# Patient Record
Sex: MALE | URBAN - METROPOLITAN AREA
[De-identification: names, ages, dates, MRNs, and addresses within clinical notes are randomized per-mention and may not be internally consistent; named-entity substitution may affect disease eponyms.]

---

## 2022-12-15 ENCOUNTER — OFFICE VISIT (OUTPATIENT)
Dept: ONCOLOGY | Age: 37
End: 2022-12-15
Payer: OTHER GOVERNMENT

## 2022-12-15 ENCOUNTER — HOSPITAL ENCOUNTER (OUTPATIENT)
Dept: LAB | Age: 37
Discharge: HOME | End: 2022-12-15
Payer: OTHER GOVERNMENT

## 2022-12-15 VITALS
WEIGHT: 201.6 LBS | BODY MASS INDEX: 31.64 KG/M2 | DIASTOLIC BLOOD PRESSURE: 80 MMHG | OXYGEN SATURATION: 95 % | HEIGHT: 67 IN | SYSTOLIC BLOOD PRESSURE: 122 MMHG | HEART RATE: 91 BPM | RESPIRATION RATE: 16 BRPM

## 2022-12-15 DIAGNOSIS — D75.839 THROMBOCYTOSIS: ICD-10-CM

## 2022-12-15 DIAGNOSIS — D75.1 POLYCYTHEMIA: ICD-10-CM

## 2022-12-15 DIAGNOSIS — D75.839 THROMBOCYTOSIS: Primary | ICD-10-CM

## 2022-12-15 LAB
ALBUMIN SERPL-MCNC: 4.6 G/DL (ref 3.4–5)
ALBUMIN/GLOB SERPL: 1.5 {RATIO} (ref 0.8–1.7)
ALP SERPL-CCNC: 84 U/L (ref 45–117)
ALT SERPL-CCNC: 53 U/L (ref 16–61)
ANION GAP SERPL CALC-SCNC: 6 MMOL/L (ref 3–18)
AST SERPL-CCNC: 21 U/L (ref 10–38)
BASOPHILS # BLD: 0.2 K/UL (ref 0–0.1)
BASOPHILS NFR BLD: 2 % (ref 0–2)
BILIRUB SERPL-MCNC: 0.5 MG/DL (ref 0.2–1)
BUN SERPL-MCNC: 22 MG/DL (ref 7–18)
BUN/CREAT SERPL: 17 (ref 12–20)
CALCIUM SERPL-MCNC: 10.2 MG/DL (ref 8.5–10.1)
CHLORIDE SERPL-SCNC: 104 MMOL/L (ref 100–111)
CO2 SERPL-SCNC: 30 MMOL/L (ref 21–32)
CREAT SERPL-MCNC: 1.27 MG/DL (ref 0.6–1.3)
DIFFERENTIAL METHOD BLD: ABNORMAL
EOSINOPHIL # BLD: 0.8 K/UL (ref 0–0.4)
EOSINOPHIL NFR BLD: 8 % (ref 0–5)
ERYTHROCYTE [DISTWIDTH] IN BLOOD BY AUTOMATED COUNT: 14.9 % (ref 11.6–14.5)
ERYTHROCYTE [SEDIMENTATION RATE] IN BLOOD: 2 MM/HR (ref 0–15)
FERRITIN SERPL-MCNC: 99 NG/ML (ref 8–388)
GLOBULIN SER CALC-MCNC: 3.1 G/DL (ref 2–4)
GLUCOSE SERPL-MCNC: 90 MG/DL (ref 74–99)
HCT VFR BLD AUTO: 46.1 % (ref 36–48)
HGB BLD-MCNC: 14.9 G/DL (ref 13–16)
IMM GRANULOCYTES # BLD AUTO: 0.7 K/UL (ref 0–0.04)
IMM GRANULOCYTES NFR BLD AUTO: 7 % (ref 0–0.5)
IRON SATN MFR SERPL: 21 % (ref 20–50)
IRON SERPL-MCNC: 85 UG/DL (ref 50–175)
LDH SERPL L TO P-CCNC: 297 U/L (ref 81–234)
LYMPHOCYTES # BLD: 2.1 K/UL (ref 0.9–3.6)
LYMPHOCYTES NFR BLD: 21 % (ref 21–52)
MCH RBC QN AUTO: 29.7 PG (ref 24–34)
MCHC RBC AUTO-ENTMCNC: 32.3 G/DL (ref 31–37)
MCV RBC AUTO: 92 FL (ref 78–100)
MONOCYTES # BLD: 0.6 K/UL (ref 0.05–1.2)
MONOCYTES NFR BLD: 6 % (ref 3–10)
NEUTS SEG # BLD: 5.5 K/UL (ref 1.8–8)
NEUTS SEG NFR BLD: 56 % (ref 40–73)
NRBC # BLD: 0 K/UL (ref 0–0.01)
NRBC BLD-RTO: 0 PER 100 WBC
PLATELET # BLD AUTO: 630 K/UL (ref 135–420)
PMV BLD AUTO: 10.4 FL (ref 9.2–11.8)
POTASSIUM SERPL-SCNC: 4.9 MMOL/L (ref 3.5–5.5)
PROT SERPL-MCNC: 7.7 G/DL (ref 6.4–8.2)
RBC # BLD AUTO: 5.01 M/UL (ref 4.35–5.65)
SODIUM SERPL-SCNC: 140 MMOL/L (ref 136–145)
TIBC SERPL-MCNC: 405 UG/DL (ref 250–450)
WBC # BLD AUTO: 9.8 K/UL (ref 4.6–13.2)

## 2022-12-15 PROCEDURE — 85652 RBC SED RATE AUTOMATED: CPT

## 2022-12-15 PROCEDURE — 83615 LACTATE (LD) (LDH) ENZYME: CPT

## 2022-12-15 PROCEDURE — 81206 BCR/ABL1 GENE MAJOR BP: CPT

## 2022-12-15 PROCEDURE — 81270 JAK2 GENE: CPT

## 2022-12-15 PROCEDURE — 36415 COLL VENOUS BLD VENIPUNCTURE: CPT

## 2022-12-15 PROCEDURE — 82668 ASSAY OF ERYTHROPOIETIN: CPT

## 2022-12-15 PROCEDURE — 80053 COMPREHEN METABOLIC PANEL: CPT

## 2022-12-15 PROCEDURE — 81338 MPL GENE COMMON VARIANTS: CPT

## 2022-12-15 PROCEDURE — 99204 OFFICE O/P NEW MOD 45 MIN: CPT | Performed by: INTERNAL MEDICINE

## 2022-12-15 PROCEDURE — 82728 ASSAY OF FERRITIN: CPT

## 2022-12-15 PROCEDURE — 81219 CALR GENE COM VARIANTS: CPT

## 2022-12-15 PROCEDURE — 83540 ASSAY OF IRON: CPT

## 2022-12-15 PROCEDURE — 85025 COMPLETE CBC W/AUTO DIFF WBC: CPT

## 2022-12-15 NOTE — PROGRESS NOTES
Hematology/Oncology Consultation Note      Date: 12/15/2022    Name: Ney Christiansen  : 1985        Unknown, Provider, MD         Subjective:     Chief complaint: Thrombocytosis    History of Present Illness:   Mr. Juliano Hurt is a most pleasant 30y.o. year old male who was seen for consultation of thrombocytosis. The patient has a past medical history significant of INOCENCIA on CPAP for about 2 months, and anxiety. He denied history of smoking. He reported on and off LUQ discomfort for the past 3 months. His PCP ordered CT A/P, still pending appointment. 2022 CBC reported total WBC 8.2, hemoglobin 15.4, hematocrit 47.5%, MCV 93, platelet 621,591. Metamyelocyte 4%  The patient reported doing better overall since started using CPAP. The patient otherwise has no other complaints. Denied fever, chills, night sweat, unintentional weight loss, skin lumps or bumps, acute bleeding or bruising issues. Denied headache, acute vision change, dizziness, chest pain, worsen shortness of breath, palpitation, productive cough, nausea, vomiting, abdominal pain, altered bowel habits, dysuria, worsen bone pain or back pain, new focal numbness or weakness. Past Medical History, Family History, and Social History:    No past medical history on file. No past surgical history on file.   Social History     Socioeconomic History    Marital status: UNKNOWN     Spouse name: Not on file    Number of children: Not on file    Years of education: Not on file    Highest education level: Not on file   Occupational History    Not on file   Tobacco Use    Smoking status: Not on file    Smokeless tobacco: Not on file   Substance and Sexual Activity    Alcohol use: Not on file    Drug use: Not on file    Sexual activity: Not on file   Other Topics Concern    Not on file   Social History Narrative    Not on file     Social Determinants of Health     Financial Resource Strain: Not on file   Food Insecurity: Not on file Transportation Needs: Not on file   Physical Activity: Not on file   Stress: Not on file   Social Connections: Not on file   Intimate Partner Violence: Not on file   Housing Stability: Not on file     No family history on file. Review of Systems   Constitutional:  Negative for chills, diaphoresis, fever, malaise/fatigue and weight loss. Respiratory:  Negative for cough, hemoptysis, shortness of breath and wheezing. Cardiovascular:  Negative for chest pain, palpitations and leg swelling. Gastrointestinal:  Negative for abdominal pain, diarrhea, heartburn, nausea and vomiting. Genitourinary:  Negative for dysuria, frequency, hematuria and urgency. Musculoskeletal:  Negative for joint pain and myalgias. Skin:  Negative for itching and rash. Neurological:  Negative for dizziness, seizures, weakness and headaches. Psychiatric/Behavioral:  Negative for depression. The patient does not have insomnia. Objective:   Visit Vitals  /80   Pulse 91   Resp 16   Ht 5' 7\" (1.702 m)   Wt 91.4 kg (201 lb 9.6 oz)   SpO2 95%   BMI 31.58 kg/m²       ECOG Performance Status (grade): 0  0 - able to carry on all pre-disease activity w/out restriction  1 - restricted but able to carry out light work  2 - ambulatory and can self- care but unable to carry out work  3 - bed or chair >50% of waking hours  4 - completely disable, total care, confined to bed or chair    Physical Exam  Constitutional:       General: He is not in acute distress. HENT:      Head: Normocephalic and atraumatic. Eyes:      Pupils: Pupils are equal, round, and reactive to light. Cardiovascular:      Pulses: Normal pulses. Heart sounds: Normal heart sounds. No murmur heard. Pulmonary:      Effort: Pulmonary effort is normal. No respiratory distress. Breath sounds: Normal breath sounds. Abdominal:      General: Bowel sounds are normal. There is no distension. Palpations: Abdomen is soft. There is no mass. Tenderness: There is no abdominal tenderness. There is no guarding. Musculoskeletal:         General: No swelling or tenderness. Cervical back: Neck supple. No rigidity. Lymphadenopathy:      Cervical: No cervical adenopathy. Skin:     General: Skin is warm. Findings: No rash. Neurological:      Mental Status: He is alert and oriented to person, place, and time. Mental status is at baseline. Cranial Nerves: No cranial nerve deficit. Psychiatric:         Mood and Affect: Mood normal.        Diagnostics:      No results found for this or any previous visit (from the past 96 hour(s)). Imaging:  No results found for this or any previous visit. No results found for this or any previous visit. No results found for this or any previous visit. Pathology          Assessment:                                        1. Thrombocytosis    2. Polycythemia        Plan:                                        # Thrombocytosis  # Polycythemia    -- Past medical history significant of INOCENCIA on CPAP for about 2 months, and anxiety. -- He denied history of smoking or testosterone injection. -- He reported on and off LUQ discomfort for the past 3 months. His PCP ordered CT A/P, still pending appointment. -- 9/21/2022 CBC reported total WBC 8.2, hemoglobin 15.4, hematocrit 47.5%, MCV 93, platelet 098,301. Metamyelocyte 4%  -- Today I have reviewed with the patient about lab findings and differential diagnosis. Plan:  -- Initial lab check: CBC with diff, smear review, Erythropoietin, Iron profile, ferritin, ESR, LDH, mutation panels to ro MPNs such as JAK2/TEODORA/MPL and BCR-ABL1.   -- Advised the patient to schedule for CT A/P  -- The patient will be notified if any interventions is warranted. Further workup will be guided by results from aforementioned initial study. -- The patient was agreeable with the plan. -- We will see the patient back in about 4 weeks.  Always sooner if required. Orders Placed This Encounter    IRON PROFILE     Standing Status:   Future     Standing Expiration Date:   12/16/2023    FERRITIN     Standing Status:   Future     Standing Expiration Date:   00/44/8098    METABOLIC PANEL, COMPREHENSIVE     Standing Status:   Future     Standing Expiration Date:   12/16/2023    CBC WITH AUTOMATED DIFF     Standing Status:   Future     Standing Expiration Date:   12/16/2023    SED RATE (ESR)     Standing Status:   Future     Standing Expiration Date:   12/15/2023    LD     Standing Status:   Future     Standing Expiration Date:   12/15/2023    PERIPHERAL SMEAR     Standing Status:   Future     Standing Expiration Date:   12/15/2023    BCR-ABL1, PCR, QT     Standing Status:   Future     Standing Expiration Date:   12/15/2023    CALR MUTATION ANALYSIS     Standing Status:   Future     Standing Expiration Date:   12/15/2023    JAK2 MUTATION ANALYSIS     Standing Status:   Future     Standing Expiration Date:   12/15/2023    MPL MUTATION ANALYSIS     Standing Status:   Future     Standing Expiration Date:   12/15/2023    ERYTHROPOIETIN     Standing Status:   Future     Standing Expiration Date:   12/15/2023             Mr. Merline Boards has a reminder for a \"due or due soon\" health maintenance. I have asked that he contact his primary care provider for follow-up on this health maintenance. All of patient's questions answered to their apparent satisfaction. They verbally show understanding and agreement with aforementioned plan. Madie Menendez MD  12/15/2022        Above mentioned total time spent for this encounter with more than 50% of the time spent in face-to-face counseling, discussing on diagnosis and management plan going forward, and co-ordination of care. Parts of this document has been produced using Dragon dictation system. Unrecognized errors in transcription may be present.  Please do not hesitate to reach out for any questions or clarifications.

## 2022-12-16 LAB — PERIPHERAL SMEAR,PSM: NORMAL

## 2022-12-17 LAB
BACKGROUND, CALR2T: NORMAL
CALR MUTATION DETECTION RESULT, CALR1T: NORMAL
EPO SERPL-ACNC: 14.8 MIU/ML (ref 2.6–18.5)
LAB DIRECTOR NAME PROVIDER: NORMAL
REF LAB TEST METHOD: NORMAL
REFERENCES, CALR4T: NORMAL

## 2022-12-21 LAB
BACKGROUND: 489207: NORMAL
DIRECTOR REVIEW: 489204: NORMAL
JAK2 P.V617F BLD/T QL: NORMAL

## 2022-12-23 ENCOUNTER — TRANSCRIBE ORDER (OUTPATIENT)
Dept: SCHEDULING | Age: 37
End: 2022-12-23

## 2022-12-23 DIAGNOSIS — R10.32 LEFT LOWER QUADRANT PAIN: ICD-10-CM

## 2022-12-23 DIAGNOSIS — D75.839 THROMBOCYTOSIS, UNSPECIFIED: Primary | ICD-10-CM

## 2022-12-23 LAB
BACKGROUND, BCR6T: NORMAL
INTERPRETATION: 480488: NEGATIVE
LAB DIRECTOR NAME PROVIDER: NORMAL
LAB DIRECTOR NAME PROVIDER: NORMAL
MPL GENE MUT TESTED BLD/T: NORMAL
MPL P.W515L+W515K+S505N BLD/T QL: NORMAL
REFERENCES, MPLM6T: NORMAL
SERVICE CMNT-IMP: NORMAL
T(ABL1,BCR)B2A2/CONTROL BLD/T: NORMAL %
T(ABL1,BCR)B2A2/CONTROL BLD/T: NORMAL %
T(ABL1,BCR)B3A2/CONTROL BLD/T: NORMAL %
T(ABL1,BCR)E1A2/CONTROL BLD/T: NORMAL %

## 2023-01-10 ENCOUNTER — HOSPITAL ENCOUNTER (OUTPATIENT)
Dept: ULTRASOUND IMAGING | Age: 38
End: 2023-01-10
Payer: OTHER GOVERNMENT

## 2023-01-10 ENCOUNTER — HOSPITAL ENCOUNTER (OUTPATIENT)
Dept: ULTRASOUND IMAGING | Age: 38
Discharge: HOME | End: 2023-01-10
Payer: OTHER GOVERNMENT

## 2023-01-10 DIAGNOSIS — R10.32 LEFT LOWER QUADRANT PAIN: ICD-10-CM

## 2023-01-10 DIAGNOSIS — D75.839 THROMBOCYTOSIS, UNSPECIFIED: ICD-10-CM

## 2023-01-10 PROCEDURE — 76705 ECHO EXAM OF ABDOMEN: CPT

## 2023-01-16 ENCOUNTER — OFFICE VISIT (OUTPATIENT)
Dept: ONCOLOGY | Age: 38
End: 2023-01-16
Payer: OTHER GOVERNMENT

## 2023-01-16 VITALS
BODY MASS INDEX: 31.8 KG/M2 | DIASTOLIC BLOOD PRESSURE: 84 MMHG | HEART RATE: 91 BPM | WEIGHT: 202.6 LBS | RESPIRATION RATE: 16 BRPM | OXYGEN SATURATION: 96 % | HEIGHT: 67 IN | SYSTOLIC BLOOD PRESSURE: 129 MMHG

## 2023-01-16 DIAGNOSIS — D75.839 THROMBOCYTOSIS: Primary | ICD-10-CM

## 2023-01-16 DIAGNOSIS — D75.1 POLYCYTHEMIA: ICD-10-CM

## 2023-01-16 PROCEDURE — 99214 OFFICE O/P EST MOD 30 MIN: CPT | Performed by: INTERNAL MEDICINE

## 2023-01-16 NOTE — PROGRESS NOTES
Hematology/Oncology Note      Date: 2023    Name: Sascha Carr  : 1985        Not, On File, MD         Subjective:     Chief complaint: Thrombocytosis    History of Present Illness:   Mr. Rolly Miller is a most pleasant 30y.o. year old male who was seen for consultation of thrombocytosis. The patient has a past medical history significant of INOCENCIA on CPAP for about few months, and anxiety. He denied history of smoking. The patient reported doing better overall since started using CPAP. He reported on and off LUQ discomfort for the past few months. His PCP ordered CT A/P; however he went to get Abd US. The patient otherwise has no other complaints. Denied fever, chills, night sweat, unintentional weight loss, skin lumps or bumps, acute bleeding or bruising issues. Denied headache, acute vision change, dizziness, chest pain, worsen shortness of breath, palpitation, productive cough, nausea, vomiting, abdominal pain, altered bowel habits, dysuria, worsen bone pain or back pain, new focal numbness or weakness. Past Medical History, Family History, and Social History:    No past medical history on file. No past surgical history on file.   Social History     Socioeconomic History    Marital status:      Spouse name: Not on file    Number of children: Not on file    Years of education: Not on file    Highest education level: Not on file   Occupational History    Not on file   Tobacco Use    Smoking status: Not on file    Smokeless tobacco: Not on file   Substance and Sexual Activity    Alcohol use: Not on file    Drug use: Not on file    Sexual activity: Not on file   Other Topics Concern    Not on file   Social History Narrative    Not on file     Social Determinants of Health     Financial Resource Strain: Not on file   Food Insecurity: Not on file   Transportation Needs: Not on file   Physical Activity: Not on file   Stress: Not on file   Social Connections: Not on file   Intimate Partner Violence: Not on file   Housing Stability: Not on file     No family history on file. Review of Systems   Constitutional:  Negative for chills, diaphoresis, fever, malaise/fatigue and weight loss. Respiratory:  Negative for cough, hemoptysis, shortness of breath and wheezing. Cardiovascular:  Negative for chest pain, palpitations and leg swelling. Gastrointestinal:  Negative for abdominal pain, diarrhea, heartburn, nausea and vomiting. Genitourinary:  Negative for dysuria, frequency, hematuria and urgency. Musculoskeletal:  Negative for joint pain and myalgias. Skin:  Negative for itching and rash. Neurological:  Negative for dizziness, seizures, weakness and headaches. Psychiatric/Behavioral:  Negative for depression. The patient does not have insomnia. Objective:   Visit Vitals  /84   Pulse 91   Resp 16   Ht 5' 7\" (1.702 m)   Wt 91.9 kg (202 lb 9.6 oz)   SpO2 96%   BMI 31.73 kg/m²       ECOG Performance Status (grade): 0  0 - able to carry on all pre-disease activity w/out restriction  1 - restricted but able to carry out light work  2 - ambulatory and can self- care but unable to carry out work  3 - bed or chair >50% of waking hours  4 - completely disable, total care, confined to bed or chair    Physical Exam  Constitutional:       General: He is not in acute distress. HENT:      Head: Normocephalic and atraumatic. Eyes:      Pupils: Pupils are equal, round, and reactive to light. Cardiovascular:      Pulses: Normal pulses. Heart sounds: Normal heart sounds. No murmur heard. Pulmonary:      Effort: Pulmonary effort is normal. No respiratory distress. Breath sounds: Normal breath sounds. Abdominal:      General: Bowel sounds are normal. There is no distension. Palpations: Abdomen is soft. There is no mass. Tenderness: There is no abdominal tenderness. There is no guarding. Musculoskeletal:         General: No swelling or tenderness.       Cervical back: Neck supple. No rigidity. Lymphadenopathy:      Cervical: No cervical adenopathy. Skin:     General: Skin is warm. Findings: No rash. Neurological:      Mental Status: He is alert and oriented to person, place, and time. Mental status is at baseline. Cranial Nerves: No cranial nerve deficit. Psychiatric:         Mood and Affect: Mood normal.        Diagnostics:      No results found for this or any previous visit (from the past 96 hour(s)). Imaging:  No results found for this or any previous visit. No results found for this or any previous visit. No results found for this or any previous visit. Pathology          Diagnosis:                                        1. Thrombocytosis    2. Polycythemia        Assessment and Plan:                                        # Thrombocytosis  # Polycythemia    -- Past medical history significant of INOCENCIA on CPAP for about 2 months, and anxiety. -- He denied history of smoking or testosterone injection. -- He reported on and off LUQ discomfort for the past 3 months. His PCP ordered CT A/P, still pending appointment. -- 9/21/2022 CBC reported total WBC 8.2, hemoglobin 15.4, hematocrit 47.5%, MCV 93, platelet 518,502. Metamyelocyte 4%  -- Today I have reviewed with the patient about lab findings and differential diagnosis. + 1/10/2023 12/15/2022 CBC reported hemoglobin 14.9, hematocrit 46.1%, WBC 9.8, platelets 679,864. ESR 2. Erythropoietin 14.8, WNL. Negative JAK2/CALR/MPL/BCR ABL 1.   + 1/10/2023 Peripheral smear review reported a fairly uniform red cell population showing minimal anisocytosis. No increased schistocytes population is identified. The white cell count appears adequate in number and consists predominantly of mature-appearing granulocytes with admixed mature lymphocytes and monocytes. No immature forms or blasts are seen.   The platelets appear increased in number but appear to have normal morphology   + 1/10/2023 Abdominal ultrasound show normal appearance of the spleen. -- It was still unclear etiology of thrombocytosis and polycythemia. We have discussed bone marrow biopsy to ro MPNs. I have discussed briefly about BMBx procedures. The patient was agreeable with the plan. Plan:  -- The patient states he will check with his PCP for the authorization of Bone marrow biopsy. He will inform our team for any updates. -- IR guided Bone marrow biopsy. -- Continue lab check CBC with diff.  -- The patient was agreeable with the plan. -- We will see the patient back in about 4 weeks to review reports. Always sooner if required. No orders of the defined types were placed in this encounter. Mr. Surinder Wood has a reminder for a \"due or due soon\" health maintenance. I have asked that he contact his primary care provider for follow-up on this health maintenance. All of patient's questions answered to their apparent satisfaction. They verbally show understanding and agreement with aforementioned plan. Francesca Posada MD  1/16/2023        Above mentioned total time spent for this encounter with more than 50% of the time spent in face-to-face counseling, discussing on diagnosis and management plan going forward, and co-ordination of care. Parts of this document has been produced using Dragon dictation system. Unrecognized errors in transcription may be present. Please do not hesitate to reach out for any questions or clarifications.

## 2023-01-31 DIAGNOSIS — R10.32 LEFT LOWER QUADRANT PAIN: ICD-10-CM

## 2023-01-31 DIAGNOSIS — D75.839 THROMBOCYTOSIS, UNSPECIFIED: Primary | ICD-10-CM

## 2023-02-04 DIAGNOSIS — R10.32 LEFT LOWER QUADRANT PAIN: ICD-10-CM

## 2023-02-04 DIAGNOSIS — D75.839 THROMBOCYTOSIS, UNSPECIFIED: Primary | ICD-10-CM

## 2023-02-06 DIAGNOSIS — R10.32 LEFT LOWER QUADRANT PAIN: ICD-10-CM

## 2023-02-06 DIAGNOSIS — D75.839 THROMBOCYTOSIS, UNSPECIFIED: Primary | ICD-10-CM

## 2023-03-02 ENCOUNTER — OFFICE VISIT (OUTPATIENT)
Age: 38
End: 2023-03-02
Payer: OTHER GOVERNMENT

## 2023-03-02 ENCOUNTER — HOSPITAL ENCOUNTER (OUTPATIENT)
Facility: HOSPITAL | Age: 38
Setting detail: INFUSION SERIES
End: 2023-03-02
Payer: OTHER GOVERNMENT

## 2023-03-02 VITALS
WEIGHT: 207 LBS | SYSTOLIC BLOOD PRESSURE: 135 MMHG | BODY MASS INDEX: 32.49 KG/M2 | HEIGHT: 67 IN | HEART RATE: 74 BPM | OXYGEN SATURATION: 96 % | DIASTOLIC BLOOD PRESSURE: 81 MMHG

## 2023-03-02 DIAGNOSIS — D75.1 SECONDARY POLYCYTHEMIA: ICD-10-CM

## 2023-03-02 DIAGNOSIS — D75.839 THROMBOCYTOSIS, UNSPECIFIED: Primary | ICD-10-CM

## 2023-03-02 PROBLEM — R06.83 SNORING: Status: ACTIVE | Noted: 2023-03-02

## 2023-03-02 PROBLEM — L30.9 ECZEMA: Status: ACTIVE | Noted: 2023-02-15

## 2023-03-02 PROBLEM — M54.50 LOW BACK PAIN: Status: ACTIVE | Noted: 2023-02-06

## 2023-03-02 PROBLEM — R10.9 ABDOMINAL PAIN: Status: ACTIVE | Noted: 2023-02-06

## 2023-03-02 PROBLEM — G47.33 OBSTRUCTIVE SLEEP APNEA: Status: ACTIVE | Noted: 2023-03-02

## 2023-03-02 PROBLEM — K21.9 ESOPHAGEAL REFLUX: Status: ACTIVE | Noted: 2023-03-02

## 2023-03-02 PROBLEM — L60.9 NAIL PROBLEM: Status: ACTIVE | Noted: 2023-02-15

## 2023-03-02 PROBLEM — H93.19 TINNITUS: Status: ACTIVE | Noted: 2023-03-02

## 2023-03-02 LAB
BASO+EOS+MONOS # BLD AUTO: 0.8 K/UL (ref 0–2.3)
BASO+EOS+MONOS NFR BLD AUTO: 7 % (ref 0.1–17)
DIFFERENTIAL METHOD BLD: ABNORMAL
ERYTHROCYTE [DISTWIDTH] IN BLOOD BY AUTOMATED COUNT: 15.2 % (ref 11.5–14.5)
HCT VFR BLD AUTO: 42.9 % (ref 36–48)
HGB BLD-MCNC: 14.1 G/DL (ref 12–16)
LYMPHOCYTES # BLD: 2.8 K/UL (ref 1.1–5.9)
LYMPHOCYTES NFR BLD: 27 % (ref 14–44)
MCH RBC QN AUTO: 31 PG (ref 25–35)
MCHC RBC AUTO-ENTMCNC: 32.9 G/DL (ref 31–37)
MCV RBC AUTO: 94.3 FL (ref 78–102)
NEUTS SEG # BLD: 6.9 K/UL (ref 1.8–9.5)
NEUTS SEG NFR BLD: 66 % (ref 40–70)
PLATELET # BLD AUTO: 600 K/UL (ref 135–420)
RBC # BLD AUTO: 4.55 M/UL (ref 4.1–5.1)
WBC # BLD AUTO: 10.5 K/UL (ref 4.5–13)

## 2023-03-02 PROCEDURE — 85025 COMPLETE CBC W/AUTO DIFF WBC: CPT

## 2023-03-02 PROCEDURE — 36415 COLL VENOUS BLD VENIPUNCTURE: CPT

## 2023-03-02 PROCEDURE — 99213 OFFICE O/P EST LOW 20 MIN: CPT | Performed by: NURSE PRACTITIONER

## 2023-03-02 PROCEDURE — 85049 AUTOMATED PLATELET COUNT: CPT

## 2023-03-02 RX ORDER — FLUTICASONE PROPIONATE 50 MCG
2 SPRAY, SUSPENSION (ML) NASAL DAILY
COMMUNITY
Start: 2016-04-20

## 2023-03-02 RX ORDER — OMEPRAZOLE 20 MG/1
CAPSULE, DELAYED RELEASE ORAL
COMMUNITY
Start: 2023-02-03

## 2023-03-02 RX ORDER — SODIUM FLUORIDE AND POTASSIUM NITRATE 5.8; 57.5 MG/ML; MG/ML
GEL, DENTIFRICE DENTAL
COMMUNITY
Start: 2023-02-03

## 2023-03-02 RX ORDER — HYDROXYZINE HYDROCHLORIDE 25 MG/1
25 TABLET, FILM COATED ORAL
COMMUNITY
Start: 2022-09-13

## 2023-03-02 ASSESSMENT — ENCOUNTER SYMPTOMS
ANAL BLEEDING: 0
BLOOD IN STOOL: 0
CONSTIPATION: 0
SHORTNESS OF BREATH: 0
CHEST TIGHTNESS: 0
SORE THROAT: 0
COLOR CHANGE: 0
ABDOMINAL PAIN: 0
NAUSEA: 0
DIARRHEA: 0

## 2023-03-02 NOTE — PROGRESS NOTES
Ran pt's CBC sample drawn by Dr. Carol Torres phlebotomist on [facility] for pt's office visit, per provider's request.     [] Copy of final results given to provider    OR    [x] Copy of preliminary results given to provider & specimen sent to [facility] main lab to obtain final results. Preliminary results scanned into patient's chart.

## 2023-03-02 NOTE — PROGRESS NOTES
HEMATOLOGY/MEDICAL ONCOLOGY PROGRESS NOTE    NAME: Milagros Toribio  : 1985  DATE: 3/2/23    PCP: Thania Wynn MD    SUBJECTIVE  Mr. Milagros Toribio is a 30 y.o. male who was seen for management of Thrombocytosis. The patient has a past medical history significant of TJ on CPAP and anxiety. He denied history of smoking. The patient reported doing better overall since started using CPAP. He reported on and off LUQ discomfort for the past few months. His PCP ordered CT A/P; however he went to get Abd US. US of Abdomen showed normal appearance of spleen. He states his PCP ordered complete US abdomen. The patient otherwise has no other complaints. Denied fever, chills, night sweat, unintentional weight loss, skin lumps or bumps, acute bleeding or bruising issues. Denied headache, acute vision change, dizziness, chest pain, shortness of breath, palpitation, productive cough, nausea, vomiting, abdominal pain, altered bowel habits, dysuria, bone pain or back pain, new focal numbness or weakness. Past Medical History, Surgical History, Family History, and Social History reviewed and remain unchanged. History reviewed. No pertinent past medical history. History reviewed. No pertinent surgical history. Social History     Socioeconomic History    Marital status:      Spouse name: None    Number of children: None    Years of education: None    Highest education level: None      History reviewed. No pertinent family history.     Social Determinants of Health     Tobacco Use: Not on file   Alcohol Use: Not on file   Financial Resource Strain: Not on file   Food Insecurity: Not on file   Transportation Needs: Not on file   Physical Activity: Not on file   Stress: Not on file   Social Connections: Not on file   Intimate Partner Violence: Not on file   Depression: Not on file   Housing Stability: Not on file        Current Outpatient Medications   Medication Sig Dispense Refill    ciclopirox (PENLAC) 8 % solution       mineral oil-hydrophilic petrolatum (AQUAPHOR) ointment       fluticasone (FLONASE) 50 MCG/ACT nasal spray 2 sprays by Nasal route daily      hydrOXYzine HCl (ATARAX) 25 MG tablet Take 25 mg by mouth      omeprazole (PRILOSEC) 20 MG delayed release capsule       Sod Fluoride-Potassium Nitrate (PREVIDENT 5000 ENAMEL PROTECT) 1.1-5 % GEL   See Instructions, Topical BID rinse and spit; do not swallow, # 100 mL, 2 total refill(s), Maintenance, Topical BID; rinse and spit; do not swallow, Pharmacy: [address] [place] [address]  [Last filled 02/03/23]       No current facility-administered medications for this visit. Review of Systems   Constitutional:  Negative for activity change, fatigue and fever. HENT:  Negative for congestion and sore throat. Respiratory:  Negative for chest tightness and shortness of breath. Cardiovascular:  Negative for chest pain, palpitations and leg swelling. Gastrointestinal:  Negative for abdominal pain, anal bleeding, blood in stool, constipation, diarrhea and nausea. Genitourinary:  Negative for hematuria. Musculoskeletal:  Negative for myalgias. Skin:  Negative for color change and pallor. Neurological:  Negative for dizziness, weakness and headaches. Psychiatric/Behavioral: Negative. OBJECTIVE    Vitals:    03/02/23 1414   BP: 135/81   Pulse: 74   SpO2: 96%       ECOG Performance Status (grade): 1  0 - able to carry on all pre-disease activity w/out restriction  1 - restricted but able to carry out light work  2 - ambulatory and can self- care but unable to carry out work  3 - bed or chair >50% of waking hours  4 - completely disable, total care, confined to bed or chair    Physical Exam  Constitutional:       Appearance: Normal appearance. HENT:      Nose: Nose normal.      Mouth/Throat:      Mouth: Mucous membranes are moist.      Pharynx: Oropharynx is clear.    Eyes:      Extraocular Movements: Extraocular movements intact. Pupils: Pupils are equal, round, and reactive to light. Cardiovascular:      Rate and Rhythm: Normal rate and regular rhythm. Pulses: Normal pulses. Heart sounds: Normal heart sounds. Pulmonary:      Effort: Pulmonary effort is normal.      Breath sounds: Normal breath sounds. Abdominal:      General: Bowel sounds are normal.   Musculoskeletal:         General: Normal range of motion. Cervical back: Normal range of motion. Skin:     General: Skin is warm and dry. Neurological:      General: No focal deficit present. Mental Status: He is alert and oriented to person, place, and time. Mental status is at baseline. Psychiatric:         Mood and Affect: Mood normal.         Behavior: Behavior normal.         Thought Content: Thought content normal.         Judgment: Judgment normal.        Wt Readings from Last 3 Encounters:   03/02/23 207 lb (93.9 kg)   01/16/23 202 lb 9.6 oz (91.9 kg)   12/15/22 201 lb 9.6 oz (91.4 kg)        Lab Results   Component Value Date    WBC 9.8 12/15/2022    HGB 14.9 12/15/2022    HCT 46.1 12/15/2022    MCV 92.0 12/15/2022     (H) 12/15/2022    LYMPHOPCT 21 12/15/2022    RBC 5.01 12/15/2022    MCH 29.7 12/15/2022    MCHC 32.3 12/15/2022    RDW 14.9 (H) 12/15/2022       Lab Results   Component Value Date     12/15/2022    K 4.9 12/15/2022     12/15/2022    CO2 30 12/15/2022    BUN 22 (H) 12/15/2022    CREATININE 1.27 12/15/2022    GLUCOSE 90 12/15/2022    CALCIUM 10.2 (H) 12/15/2022    PROT 7.7 12/15/2022    LABALBU 4.6 12/15/2022    BILITOT 0.5 12/15/2022    ALKPHOS 84 12/15/2022    AST 21 12/15/2022    ALT 53 12/15/2022    AGRATIO 1.5 12/15/2022    GLOB 3.1 12/15/2022       Lab Results   Component Value Date    IRON 85 12/15/2022    TIBC 405 12/15/2022    FERRITIN 99 12/15/2022        Iron Saturation   Date Value Ref Range Status   12/15/2022 21 20 - 50 % Final         ASSESSMENT  1.  Thrombocytosis, unspecified    2. Secondary polycythemia        PLAN  Thrombocytosis  Polycythemia    --Past medical history significant of TJ on CPAP for about 2 months, and anxiety. --He denied history of smoking or testosterone injection. --He reported on and off LUQ discomfort for the past 3 months. His PCP ordered CT A/P, still pending appointment. -- 9/21/2022 CBC reported total WBC 8.2, hemoglobin 15.4, hematocrit 47.5%, MCV 93, platelet 677,962. Metamyelocyte 4%  -- Today I have reviewed with the patient about lab findings and differential diagnosis. + 1/10/2023 12/15/2022 CBC reported hemoglobin 14.9, hematocrit 46.1%, WBC 9.8, platelets 629,686. ESR 2. Erythropoietin 14.8, WNL. Negative JAK2/CALR/MPL/BCR ABL 1.   + 1/10/2023 Peripheral smear review reported a fairly uniform red cell population showing minimal anisocytosis. No increased schistocytes population is identified. The white cell count appears adequate in number and consists predominantly of mature-appearing granulocytes with admixed mature lymphocytes and monocytes. No immature forms or blasts are seen. The platelets appear increased in number but appear to have normal morphology   + 1/10/2023 Abdominal ultrasound show normal appearance of the spleen. --It was still unclear etiology of thrombocytosis and polycythemia. --Discussed bone marrow biopsy to ro MPNs last office visit. --US Abdomen showed: Normal appearance of spleen  --03/02/2023 CBC showed WBC 10.5, hemoglobin 14.1g/dL with hematocrit of 42.9%. Platelet has decreased from 630 to 613. Plan:  --PCP ordered Complete US Abdomen  --Discussed bone marrow biopsy to ro MPNs last office visit. Today he states he has not decided about Bone Marrow Biopsy yet. He prefers to do his Complete US Abdomen ordered by his PCP. --Will continue to follow up. --Will obtain labs same day. --Patient agreed with plan and verbalized understanding.     Milagros Toribio has a reminder for a \"due or due soon\" health maintenance. I have asked patient to contact his primary care provider for follow-up on this health maintenance. All of patient's questions were answered to his apparent satisfaction. He verbally showed understanding and agreement with aforementioned plan. Follow up in 3 weeks or sooner if indicated.     Barber Garcia, [address]  3/2/23

## 2023-03-07 ENCOUNTER — HOSPITAL ENCOUNTER (OUTPATIENT)
Facility: HOSPITAL | Age: 38
Discharge: HOME | End: 2023-03-10

## 2023-03-07 DIAGNOSIS — R10.32 LEFT LOWER QUADRANT PAIN: ICD-10-CM

## 2023-03-07 DIAGNOSIS — D75.839 THROMBOCYTOSIS, UNSPECIFIED: ICD-10-CM

## 2023-03-10 ENCOUNTER — HOSPITAL ENCOUNTER (OUTPATIENT)
Facility: HOSPITAL | Age: 38
Discharge: HOME | End: 2023-03-10
Payer: OTHER GOVERNMENT

## 2023-03-10 DIAGNOSIS — R10.32 LLQ PAIN: ICD-10-CM

## 2023-03-10 LAB — GLUCOSE BLD STRIP.AUTO-MCNC: 104 MG/DL (ref 70–110)

## 2023-03-10 PROCEDURE — 74177 CT ABD & PELVIS W/CONTRAST: CPT

## 2023-03-10 PROCEDURE — 6360000004 HC RX CONTRAST MEDICATION: Performed by: HEALTH CARE PROVIDER

## 2023-03-10 PROCEDURE — 82962 GLUCOSE BLOOD TEST: CPT

## 2023-03-10 RX ADMIN — IOPAMIDOL 100 ML: 612 INJECTION, SOLUTION INTRAVENOUS at 10:57

## 2023-03-10 NOTE — PROGRESS NOTES
ED called to CT stick room to evaluate pt for syncopal episode. Dr. Tomer García from ED evaluated. VSS pulse 68, /81, resp 16, Pox 100% RA, FSBG 104. Pt A&O x3 at this time, CT tech described \"shaking and pale skin ~ 30 sec in length after IV started. Per Dr. Tomer García no further workup needed, notify ED of any changes.  Note written by Chasidy Mckeon RN